# Patient Record
Sex: FEMALE | Race: WHITE | Employment: FULL TIME | URBAN - METROPOLITAN AREA
[De-identification: names, ages, dates, MRNs, and addresses within clinical notes are randomized per-mention and may not be internally consistent; named-entity substitution may affect disease eponyms.]

---

## 2017-05-01 RX ORDER — DESOGESTREL AND ETHINYL ESTRADIOL 21-5 (28)
1 KIT ORAL DAILY
COMMUNITY

## 2017-05-01 NOTE — PERIOP NOTES
[facility]  Ambulatory Surgery Unit  Pre-operative Instructions    Surgery/Procedure Date  5/3/17            Tentative Arrival Time 0645      1. On the day of your surgery/procedure, please report to the Ambulatory Surgery Unit Registration Desk and sign in at your designated time. The Ambulatory Surgery Unit is located in [facility] on the [facility] side of the [facility] across from the [address] [place] [address]. Please have all of your health insurance cards and a photo ID. 2. You must have someone with you to drive you home, as you should not drive a car for 24 hours following anesthesia. Please make arrangements for a responsible adult friend or family member to stay with you for at least the first 24 hours after your surgery. 3. Do not have anything to eat or drink (including water, gum, mints, coffee, juice) after midnight   5/2/17. This may not apply to medications prescribed by your physician. (Please note below the special instructions with medications to take the morning of surgery, if applicable.)    4. We recommend you do not drink any alcoholic beverages for 24 hours before and after your surgery. 5. Stop all Aspirin, non-steroidal anti-inflammatory drugs (i.e. Advil, Aleve), vitamins, and supplements as directed by your surgeon's office. **If you are currently taking Plavix, Coumadin, or other blood-thinning agents, contact your surgeon for instructions. **    6. In an effort to help prevent surgical site infection, we ask that you shower with an anti-bacterial soap (i.e. Dial or Safeguard) for 3 days prior to and on the morning of surgery, using a fresh towel after each shower. (Please begin this process with fresh bed linens.) Do not apply any lotions, powders, or deodorants after the shower on the day of your procedure. If applicable, please do not shave the operative site for 48 hours prior to surgery. 7. Wear comfortable clothes. Wear glasses instead of contacts. Do not bring any jewelry or money (other than copays or fees as instructed). Do not wear make-up, particularly mascara, the morning of your surgery. Do not wear nail polish, particularly if you are having foot /hand surgery. Wear your hair loose or down, no ponytails, buns, suellen pins or clips. All body piercings must be removed. 8. You should understand that if you do not follow these instructions your surgery may be cancelled. If your physical condition changes (i.e. fever, cold or flu) please contact your surgeon as soon as possible. 9. It is important that you be on time. If a situation occurs where you may be late, or if you have any questions or problems, please call [phone].    10. Your surgery time may be subject to change. You will receive a phone call the day prior to surgery to confirm your arrival time. 11. Pediatric patients: please bring a change of clothes, diapers, bottle/sippy cup, pacifier, etc.      Special Instructions: Take all medications and inhalers, as prescribed, on the morning of surgery with a sip of water EXCEPT: none      I understand a pre-operative phone call will be made to verify my surgery time. In the event that I am not available, I give permission for a message to be left on my answering service and/or with another person?       Yes     (instructions given verbally during phone assessment- pt voiced understanding)     ___________________      ___________________      ________________  (Signature of Patient)          (Witness)                   (Date and Time)

## 2017-05-02 ENCOUNTER — ANESTHESIA EVENT (OUTPATIENT)
Dept: SURGERY | Age: 22
End: 2017-05-02
Payer: COMMERCIAL

## 2017-05-03 ENCOUNTER — HOSPITAL ENCOUNTER (OUTPATIENT)
Age: 22
Setting detail: OUTPATIENT SURGERY
Discharge: HOME | End: 2017-05-03
Attending: OBSTETRICS & GYNECOLOGY | Admitting: OBSTETRICS & GYNECOLOGY
Payer: COMMERCIAL

## 2017-05-03 ENCOUNTER — ANESTHESIA (OUTPATIENT)
Dept: SURGERY | Age: 22
End: 2017-05-03
Payer: COMMERCIAL

## 2017-05-03 VITALS
OXYGEN SATURATION: 97 % | RESPIRATION RATE: 20 BRPM | HEART RATE: 91 BPM | WEIGHT: 139 LBS | HEIGHT: 62 IN | TEMPERATURE: 98.4 F | SYSTOLIC BLOOD PRESSURE: 126 MMHG | DIASTOLIC BLOOD PRESSURE: 82 MMHG | BODY MASS INDEX: 25.58 KG/M2

## 2017-05-03 LAB — HCG UR QL: NEGATIVE

## 2017-05-03 PROCEDURE — 74011000250 HC RX REV CODE- 250

## 2017-05-03 PROCEDURE — 88305 TISSUE EXAM BY PATHOLOGIST: CPT | Performed by: OBSTETRICS & GYNECOLOGY

## 2017-05-03 PROCEDURE — 74011250636 HC RX REV CODE- 250/636: Performed by: ANESTHESIOLOGY

## 2017-05-03 PROCEDURE — 76210000040 HC AMBSU PH I REC FIRST 0.5 HR: Performed by: OBSTETRICS & GYNECOLOGY

## 2017-05-03 PROCEDURE — 76210000046 HC AMBSU PH II REC FIRST 0.5 HR: Performed by: OBSTETRICS & GYNECOLOGY

## 2017-05-03 PROCEDURE — 77030003666 HC NDL SPINAL BD -A: Performed by: OBSTETRICS & GYNECOLOGY

## 2017-05-03 PROCEDURE — 74011250636 HC RX REV CODE- 250/636: Performed by: OBSTETRICS & GYNECOLOGY

## 2017-05-03 PROCEDURE — 77030020255 HC SOL INJ LR 1000ML BG: Performed by: OBSTETRICS & GYNECOLOGY

## 2017-05-03 PROCEDURE — 77030018836 HC SOL IRR NACL ICUM -A: Performed by: OBSTETRICS & GYNECOLOGY

## 2017-05-03 PROCEDURE — 77030019927 HC TBNG IRR CYSTO BAXT -A: Performed by: OBSTETRICS & GYNECOLOGY

## 2017-05-03 PROCEDURE — 74011250636 HC RX REV CODE- 250/636

## 2017-05-03 PROCEDURE — 77030021352 HC CBL LD SYS DISP COVD -B: Performed by: OBSTETRICS & GYNECOLOGY

## 2017-05-03 PROCEDURE — 74011000250 HC RX REV CODE- 250: Performed by: OBSTETRICS & GYNECOLOGY

## 2017-05-03 PROCEDURE — 76030000000 HC AMB SURG OR TIME 0.5 TO 1: Performed by: OBSTETRICS & GYNECOLOGY

## 2017-05-03 PROCEDURE — 76060000061 HC AMB SURG ANES 0.5 TO 1 HR: Performed by: OBSTETRICS & GYNECOLOGY

## 2017-05-03 PROCEDURE — 77030031139 HC SUT VCRL2 J&J -A: Performed by: OBSTETRICS & GYNECOLOGY

## 2017-05-03 PROCEDURE — 81025 URINE PREGNANCY TEST: CPT

## 2017-05-03 RX ORDER — SODIUM CHLORIDE 0.9 % (FLUSH) 0.9 %
5-10 SYRINGE (ML) INJECTION AS NEEDED
Status: DISCONTINUED | OUTPATIENT
Start: 2017-05-03 | End: 2017-05-03 | Stop reason: HOSPADM

## 2017-05-03 RX ORDER — SODIUM CHLORIDE 9 MG/ML
INJECTION, SOLUTION INTRAVENOUS
Status: DISCONTINUED | OUTPATIENT
Start: 2017-05-03 | End: 2017-05-03 | Stop reason: HOSPADM

## 2017-05-03 RX ORDER — ONDANSETRON 2 MG/ML
4 INJECTION INTRAMUSCULAR; INTRAVENOUS AS NEEDED
Status: DISCONTINUED | OUTPATIENT
Start: 2017-05-03 | End: 2017-05-03 | Stop reason: HOSPADM

## 2017-05-03 RX ORDER — PROPOFOL 10 MG/ML
INJECTION, EMULSION INTRAVENOUS
Status: DISCONTINUED | OUTPATIENT
Start: 2017-05-03 | End: 2017-05-03 | Stop reason: HOSPADM

## 2017-05-03 RX ORDER — FENTANYL CITRATE 50 UG/ML
25 INJECTION, SOLUTION INTRAMUSCULAR; INTRAVENOUS
Status: DISCONTINUED | OUTPATIENT
Start: 2017-05-03 | End: 2017-05-03 | Stop reason: HOSPADM

## 2017-05-03 RX ORDER — DIPHENHYDRAMINE HYDROCHLORIDE 50 MG/ML
12.5 INJECTION, SOLUTION INTRAMUSCULAR; INTRAVENOUS AS NEEDED
Status: DISCONTINUED | OUTPATIENT
Start: 2017-05-03 | End: 2017-05-03 | Stop reason: HOSPADM

## 2017-05-03 RX ORDER — SODIUM CHLORIDE 0.9 % (FLUSH) 0.9 %
5-10 SYRINGE (ML) INJECTION EVERY 8 HOURS
Status: DISCONTINUED | OUTPATIENT
Start: 2017-05-03 | End: 2017-05-03 | Stop reason: HOSPADM

## 2017-05-03 RX ORDER — MORPHINE SULFATE 10 MG/ML
2 INJECTION, SOLUTION INTRAMUSCULAR; INTRAVENOUS
Status: DISCONTINUED | OUTPATIENT
Start: 2017-05-03 | End: 2017-05-03 | Stop reason: HOSPADM

## 2017-05-03 RX ORDER — HYDROMORPHONE HYDROCHLORIDE 1 MG/ML
.2-.5 INJECTION, SOLUTION INTRAMUSCULAR; INTRAVENOUS; SUBCUTANEOUS
Status: DISCONTINUED | OUTPATIENT
Start: 2017-05-03 | End: 2017-05-03 | Stop reason: HOSPADM

## 2017-05-03 RX ORDER — SODIUM CHLORIDE, SODIUM LACTATE, POTASSIUM CHLORIDE, CALCIUM CHLORIDE 600; 310; 30; 20 MG/100ML; MG/100ML; MG/100ML; MG/100ML
25 INJECTION, SOLUTION INTRAVENOUS CONTINUOUS
Status: DISCONTINUED | OUTPATIENT
Start: 2017-05-03 | End: 2017-05-03 | Stop reason: HOSPADM

## 2017-05-03 RX ORDER — KETOROLAC TROMETHAMINE 30 MG/ML
INJECTION, SOLUTION INTRAMUSCULAR; INTRAVENOUS AS NEEDED
Status: DISCONTINUED | OUTPATIENT
Start: 2017-05-03 | End: 2017-05-03 | Stop reason: HOSPADM

## 2017-05-03 RX ORDER — LIDOCAINE HYDROCHLORIDE 20 MG/ML
INJECTION, SOLUTION EPIDURAL; INFILTRATION; INTRACAUDAL; PERINEURAL AS NEEDED
Status: DISCONTINUED | OUTPATIENT
Start: 2017-05-03 | End: 2017-05-03 | Stop reason: HOSPADM

## 2017-05-03 RX ORDER — MIDAZOLAM HYDROCHLORIDE 1 MG/ML
INJECTION, SOLUTION INTRAMUSCULAR; INTRAVENOUS AS NEEDED
Status: DISCONTINUED | OUTPATIENT
Start: 2017-05-03 | End: 2017-05-03 | Stop reason: HOSPADM

## 2017-05-03 RX ORDER — ONDANSETRON 2 MG/ML
INJECTION INTRAMUSCULAR; INTRAVENOUS AS NEEDED
Status: DISCONTINUED | OUTPATIENT
Start: 2017-05-03 | End: 2017-05-03 | Stop reason: HOSPADM

## 2017-05-03 RX ORDER — FENTANYL CITRATE 50 UG/ML
INJECTION, SOLUTION INTRAMUSCULAR; INTRAVENOUS AS NEEDED
Status: DISCONTINUED | OUTPATIENT
Start: 2017-05-03 | End: 2017-05-03 | Stop reason: HOSPADM

## 2017-05-03 RX ORDER — SILVER NITRATE 38.21; 12.74 MG/1; MG/1
STICK TOPICAL AS NEEDED
Status: DISCONTINUED | OUTPATIENT
Start: 2017-05-03 | End: 2017-05-03 | Stop reason: HOSPADM

## 2017-05-03 RX ADMIN — FENTANYL CITRATE 25 MCG: 50 INJECTION, SOLUTION INTRAMUSCULAR; INTRAVENOUS at 08:04

## 2017-05-03 RX ADMIN — FENTANYL CITRATE 25 MCG: 50 INJECTION, SOLUTION INTRAMUSCULAR; INTRAVENOUS at 08:11

## 2017-05-03 RX ADMIN — FENTANYL CITRATE 25 MCG: 50 INJECTION, SOLUTION INTRAMUSCULAR; INTRAVENOUS at 08:15

## 2017-05-03 RX ADMIN — ONDANSETRON 4 MG: 2 INJECTION INTRAMUSCULAR; INTRAVENOUS at 08:16

## 2017-05-03 RX ADMIN — LIDOCAINE HYDROCHLORIDE 80 MG: 20 INJECTION, SOLUTION EPIDURAL; INFILTRATION; INTRACAUDAL; PERINEURAL at 08:04

## 2017-05-03 RX ADMIN — KETOROLAC TROMETHAMINE 30 MG: 30 INJECTION, SOLUTION INTRAMUSCULAR; INTRAVENOUS at 08:36

## 2017-05-03 RX ADMIN — SODIUM CHLORIDE, SODIUM LACTATE, POTASSIUM CHLORIDE, AND CALCIUM CHLORIDE 25 ML/HR: 600; 310; 30; 20 INJECTION, SOLUTION INTRAVENOUS at 07:28

## 2017-05-03 RX ADMIN — SODIUM CHLORIDE, POTASSIUM CHLORIDE, SODIUM LACTATE AND CALCIUM CHLORIDE: 600; 310; 30; 20 INJECTION, SOLUTION INTRAVENOUS at 07:04

## 2017-05-03 RX ADMIN — PROPOFOL 100 MCG/KG/MIN: 10 INJECTION, EMULSION INTRAVENOUS at 08:04

## 2017-05-03 RX ADMIN — FENTANYL CITRATE 25 MCG: 50 INJECTION, SOLUTION INTRAMUSCULAR; INTRAVENOUS at 08:10

## 2017-05-03 RX ADMIN — MIDAZOLAM HYDROCHLORIDE 4 MG: 1 INJECTION, SOLUTION INTRAMUSCULAR; INTRAVENOUS at 08:02

## 2017-05-03 NOTE — OP NOTES
Gynecologic Hysteroscopy, D&C Procedure Note    Patient ID:     Name: Bessy    Medical Record Number: [id number]    YOB: 1995    May 3, 2017      Preoperative Diagnosis:   DYSFUNCTIONAL UTERINE BLEEDING    Postoperative Diagnosis: DYSFUNCTIONAL UTERINE BLEEDING    Surgeon: Kang Banerjee MD    Assistant: None    Anesthesia: General    Procedure: 1. Hysteroscopy            2. Dilation and curettage    Estimated Blood Loss: Minimal    Pathology /Specimens: Endometrial curettings    Complications: None    Findings: 1. Normal size, shape, and contoured uterus without any adnexal masses on exam under anesthesia. 2. Uterus sounded to 7cm. 3. Normal-appearing endometrial cavity. Thin endometrium without polyps. Ostia seen bilaterally. Signed By: Kang Banerjee MD    Indication: This is a 19year-old  1 para 0 with approximately 6 months of daily vaginal bleeding despite attempts to control it with combined oral contraceptives. The bleeding has responded in the past to the addition of estradiol via a patch but has failed to do so in recent months. Lab evaluation is reassuring. While a pelvic ultrasound reveals a thin endometrium at 1.8mm, an endometrial biopsy was suggestive of endometrial polyps. Given this discrepancy, we discussed further work-up and management via hysteroscopy, D&C, possible endometrial polypectomy to which the patient opted to proceed. Procedure in Detail:  The patient was taken to the operating room where she was correctly identified and placed on the operating room table. Anesthesia was induced and she was placed in the dorsal lithotomy position. The patient was prepped and draped in a sterile fashion. Exam under anesthesia revealed the above noted findings. Retractors were introduced into the vagina and the cervix was visualized and grasped with a single tooth tenaculum.  The uterus was sounded to 7 cm and the cervix was progressively dilated to accommodate the hysteroscope. The hysteroscope was inserted and the uterine cavity visualized including the bilateral ostia. The hysteroscope was removed. The endometrial cavity was curetted until an adequate cry was achieved throughout. A small amount of normal-appearing endometrial tissue was obtained and sent to pathology for analysis. All instruments were removed from the uterus. The tenaculum was removed from the cervix and the sites made hemostatic with a figure-of-eight of 3-0 vicryl and silver nitrate. All instruments were removed from the patient's vagina. This now concluded the procedure. All sponge and instrument counts were correct. The patient was cleansed, awakened and taken to the recovery room in stable condition.

## 2017-05-03 NOTE — ANESTHESIA POSTPROCEDURE EVALUATION
Post-Anesthesia Evaluation and Assessment    Patient: Rickey Antoine MRN: [id number]  SSN: [id number]    YOB: 1995  Age: 19 y.o. Sex: female       Cardiovascular Function/Vital Signs  Visit Vitals    /82    Pulse 91    Temp 36.9 °C (98.4 °F)    Resp 20    Ht 5' 2\" (1.575 m)    Wt 63 kg (139 lb)    SpO2 97%    BMI 25.42 kg/m2       Patient is status post general, total IV anesthesia anesthesia for Procedure(s): HYSTEROSCOPY DILITATION AND CURETTAGE. Nausea/Vomiting: None    Postoperative hydration reviewed and adequate. Pain:  Pain Scale 1: Numeric (0 - 10) (05/03/17 0916)  Pain Intensity 1: 0 (05/03/17 0916)   Managed    Neurological Status:   Neuro (WDL): Within Defined Limits (05/03/17 0916)  Neuro  Neurologic State: Alert (05/03/17 0916)   At baseline    Mental Status and Level of Consciousness: Arousable    Pulmonary Status:   O2 Device: Room air (05/03/17 0916)   Adequate oxygenation and airway patent    Complications related to anesthesia: None    Post-anesthesia assessment completed.  No concerns    Signed By: Hai Dumont MD     May 3, 2017

## 2017-05-03 NOTE — DISCHARGE INSTRUCTIONS
After Care Instructions For Your D&C      1. You may resume your usual diet once the nausea resolves. Initially, try sips of warm fluids and a bland diet. 2. Avoid heavy lifting and straining. Gradually increase your activity. First, try walking and doing light activity around the house. Resume your normal habits if no significant discomfort or bleeding develops. Most women can return to work within one to four days after this procedure. 3. You may take showers. Avoid using a tub bath, swimming pool or hot tub until after your check-up. 4. Do not place anything in your vagina until after your postoperative visit. Do not   douche, use tampons, or have intercourse because this may cause bleeding and   infection. 5. You may initially experience a heavy bloody discharge. This should not be more than your menstrual flow. Over the next several days, the flow should steadily decrease. 6. Typically following the procedure, there is little or no pain. You may feel cramps in your lower abdomen. Tylenol may relieve mild cramping. If pain medication does not improve your symptoms, you should contact your physician. 7. Contact the office if you have excessive bleeding (saturating a pad an hour for two hours or passing large clots). It is also necessary to speak with your physician if you develop chills, a temperature greater than 100.4, difficulty voiding or burning on urination. 8. Your physician may want to see you in the office after your D&C. Please call for an appointment if this has not already been arranged. Our office phone number is [phone]. If appropriate, the microscopic results from your procedure will be discussed at this follow-up visit. You received Toradol during your surgery.  You may not take any form of NSAIDS (non steroidal anti inflammatory drugs) such as Advil, Ibuprofen, Aleve, Motrin until 2:00pm.        DISCHARGE SUMMARY from Nurse    The following personal items collected during your admission are returned to you:   Dental Appliance: Dental Appliances: None  Vision: Visual Aid: Glasses (given to boyfriend)  Hearing Aid:    Jewelry: Jewelry: None  Clothing: Clothing: With patient  Other Valuables: Other Valuables: Priscilla Nathanael, Cell Phone (given to boyfriend)  Valuables sent to safe:        PATIENT INSTRUCTIONS:    After General Anesthesia or Intravenous Sedation, for 24 hours or while taking prescription Narcotics:        Someone should be with you for the next 24 hours. For your own safety, a responsible adult must drive you home. · Limit your activities  · Recommended activity: Rest today, up with assistance today. Do not climb stairs or shower unattended for the next 24 hours. · Please start with a soft bland diet and advance as tolerated (no nausea) to regular diet. · If you have a sore throat you should try the following: fluids, warm salt water gargles, or throat lozenges. If it does not improve after several days please follow up with your primary physician. · Do not drive and operate hazardous machinery  · Do not make important personal or business decisions  · Do  not drink alcoholic beverages  · If you have not urinated within 8 hours after discharge, please contact your surgeon on call. Report the following to your surgeon:  · Excessive pain, swelling, redness or odor of or around the surgical area  · Temperature over 100.5  · Nausea and vomiting lasting longer than 4 hours or if unable to take medications  · Any signs of decreased circulation or nerve impairment to extremity: change in color, persistent  numbness, tingling, coldness or increase pain      · You will receive a Post Operative Call from one of the Recovery Room Nurses on the day after your surgery to check on you. It is very important for us to know how you are recovering after your surgery.  If you have an issue or need to speak with someone, please call your surgeon, do not wait for the post operative call. · You may receive an e-mail or letter in the mail from [employer] regarding your experience with us in the Ambulatory Surgery Unit. Your feedback is valuable to us and we appreciate your participation in the survey. · If the above instructions are not adequate, please contact Ishmael Luis RN, Chandrika anesthesia Nurse Manager or our Anesthesiologist, at [phone]. If you are having problems after your surgery, call the physician at his office number. · We wish you a speedy recovery ? What to do at Home:      *  Please give a list of your current medications to your Primary Care Provider. *  Please update this list whenever your medications are discontinued, doses are      changed, or new medications (including over-the-counter products) are added. *  Please carry medication information at all times in case of emergency situations. These are general instructions for a healthy lifestyle:    No smoking/ No tobacco products/ Avoid exposure to second hand smoke    Surgeon General's Warning:  Quitting smoking now greatly reduces serious risk to your health. Obesity, smoking, and sedentary lifestyle greatly increases your risk for illness    A healthy diet, regular physical exercise & weight monitoring are important for maintaining a healthy lifestyle    You may be retaining fluid if you have a history of heart failure or if you experience any of the following symptoms:  Weight gain of 3 pounds or more overnight or 5 pounds in a week, increased swelling in our hands or feet or shortness of breath while lying flat in bed. Please call your doctor as soon as you notice any of these symptoms; do not wait until your next office visit.     Recognize signs and symptoms of STROKE:    F-face looks uneven    A-arms unable to move or move even    S-speech slurred or non-existent    T-time-call 911 as soon as signs and symptoms begin-DO NOT go       Back to bed or wait to see if you get better-TIME IS BRAIN. If you have not received your influenza and/or pneumococcal vaccine, please follow up with your primary care physician. The discharge information has been reviewed with the patient and caregiver. The patient and caregiver verbalized understanding.

## 2017-05-03 NOTE — ANESTHESIA POSTPROCEDURE EVALUATION
Post-Anesthesia Evaluation and Assessment    Patient: Cherelle Ramsey MRN: [id number]  SSN: [id number]    YOB: 1995  Age: 19 y.o. Sex: female       Cardiovascular Function/Vital Signs  Visit Vitals    /82    Pulse 91    Temp 36.9 °C (98.4 °F)    Resp 20    Ht 5' 2\" (1.575 m)    Wt 63 kg (139 lb)    SpO2 97%    BMI 25.42 kg/m2       Patient is status post general, total IV anesthesia anesthesia for Procedure(s): HYSTEROSCOPY DILITATION AND CURETTAGE. Nausea/Vomiting: None    Postoperative hydration reviewed and adequate. Pain:  Pain Scale 1: Numeric (0 - 10) (05/03/17 0916)  Pain Intensity 1: 0 (05/03/17 0916)   Managed    Neurological Status:   Neuro (WDL): Within Defined Limits (05/03/17 0916)  Neuro  Neurologic State: Alert (05/03/17 0916)   At baseline    Mental Status and Level of Consciousness: Arousable    Pulmonary Status:   O2 Device: Room air (05/03/17 0916)   Adequate oxygenation and airway patent    Complications related to anesthesia: None    Post-anesthesia assessment completed.  No concerns    Signed By: Rios Howard MD     May 3, 2017

## 2017-05-03 NOTE — ANESTHESIA PREPROCEDURE EVALUATION
Anesthetic History   No history of anesthetic complications            Review of Systems / Medical History  Patient summary reviewed, nursing notes reviewed and pertinent labs reviewed    Pulmonary  Within defined limits                 Neuro/Psych   Within defined limits           Cardiovascular  Within defined limits                Exercise tolerance: >4 METS     GI/Hepatic/Renal  Within defined limits              Endo/Other  Within defined limits           Other Findings              Physical Exam    Airway  Mallampati: II  TM Distance: 4 - 6 cm  Neck ROM: normal range of motion   Mouth opening: Normal     Cardiovascular  Regular rate and rhythm,  S1 and S2 normal,  no murmur, click, rub, or gallop             Dental  No notable dental hx       Pulmonary  Breath sounds clear to auscultation               Abdominal  GI exam deferred       Other Findings            Anesthetic Plan    ASA: 1  Anesthesia type: general and total IV anesthesia          Induction: Intravenous  Anesthetic plan and risks discussed with: Patient

## 2017-05-03 NOTE — IP AVS SNAPSHOT
[address] [facility] 
[phone] Patient: Bessy MRN: [id number] :1995 You are allergic to the following Allergen Reactions Keflex (Cephalexin) Other (comments) \"doesn't work\" Recent Documentation Height Weight BMI OB Status Smoking Status 1.575 m 63 kg 25.42 kg/m2 Unknown Never Smoker Emergency Contacts Name Discharge Info Relation Home Work Mobile Felecia Carolin  Mother [14] [phone] [address] CAREGIVER [3] Boyfriend [[phone]] [phone] Michael Lima CAREGIVER [3] Friend [5] [phone] About your hospitalization You were admitted on:  May 3, 2017 You last received care in the:  [facility] PACU You were discharged on:  May 3, 2017 Unit phone number:  [phone] Why you were hospitalized Your primary diagnosis was:  Not on File Providers Seen During Your Hospitalizations Provider Role Specialty Primary office phone Kang Banerjee MD Attending Provider Obstetrics & Gynecology [phone] Your Primary Care Physician (PCP) Primary Care Physician Office Phone Office Fax NOT ON FILE ** None ** ** None ** Follow-up Information Follow up With Details Comments Contact Info Kang Banerjee MD Schedule an appointment as soon as possible for a visit in 2 week(s)  [address] [facility] 
[phone] Current Discharge Medication List  
  
CONTINUE these medications which have NOT CHANGED Dose & Instructions Dispensing Information Comments Morning Noon Evening Bedtime KARIVA (28) 0.15-0.02 mgx21 /0.01 mg x 5 Tab Generic drug:  desog-e.estradiol/e.estradiol Your last dose was: Your next dose is:    
   
   
 Dose:  1 Tab Take 1 Tab by mouth daily. Refills:  0 Discharge Instructions After Care Instructions For Your D&C 
 
 
1. You may resume your usual diet once the nausea resolves. Initially, try sips of warm fluids and a bland diet. 2. Avoid heavy lifting and straining. Gradually increase your activity. First, try walking and doing light activity around the house. Resume your normal habits if no significant discomfort or bleeding develops. Most women can return to work within one to four days after this procedure. 3. You may take showers. Avoid using a tub bath, swimming pool or hot tub until after your check-up. 4. Do not place anything in your vagina until after your postoperative visit. Do not  
douche, use tampons, or have intercourse because this may cause bleeding and  
infection. 5. You may initially experience a heavy bloody discharge. This should not be more than your menstrual flow. Over the next several days, the flow should steadily decrease. 6. Typically following the procedure, there is little or no pain. You may feel cramps in your lower abdomen. Tylenol may relieve mild cramping. If pain medication does not improve your symptoms, you should contact your physician. 7. Contact the office if you have excessive bleeding (saturating a pad an hour for two hours or passing large clots). It is also necessary to speak with your physician if you develop chills, a temperature greater than 100.4, difficulty voiding or burning on urination. 8. Your physician may want to see you in the office after your D&C. Please call for an appointment if this has not already been arranged. Our office phone number is [phone]. If appropriate, the microscopic results from your procedure will be discussed at this follow-up visit. You received Toradol during your surgery.  You may not take any form of NSAIDS (non steroidal anti inflammatory drugs) such as Advil, Ibuprofen, Aleve, Motrin until 2:00pm. 
 
 
 
 DISCHARGE SUMMARY from Nurse The following personal items collected during your admission are returned to you:  
Dental Appliance: Dental Appliances: None Vision: Visual Aid: Glasses (given to boyfriend) Hearing Aid:   
Jewelry: Jewelry: None Clothing: Clothing: With patient Other Valuables: Other Valuables: Gallo Calhoun, Cell Phone (given to boyfriend) Valuables sent to safe:   
 
 
PATIENT INSTRUCTIONS: 
 
 
F-face looks uneven A-arms unable to move or move even S-speech slurred or non-existent T-time-call 911 as soon as signs and symptoms begin-DO NOT go Back to bed or wait to see if you get better-TIME IS BRAIN. If you have not received your influenza and/or pneumococcal vaccine, please follow up with your primary care physician. The discharge information has been reviewed with the patient and caregiver. The patient and caregiver verbalized understanding. Discharge Orders None Introducing [facility] SERVICES! [employer] introduces [employer] patient portal. Now you can access parts of your medical record, email your doctor's office, and request medication refills online. 1. In your internet browser, go to [email or web address] 2. Click on the First Time User? Click Here link in the Sign In box. You will see the New Member Sign Up page. 3. Enter your [employer] Access Code exactly as it appears below. You will not need to use this code after youve completed the sign-up process. If you do not sign up before the expiration date, you must request a new code. · [employer] Access Code: [email or web address] Expires: 7/17/2017 10:42 AM 
 
4. Enter the last four digits of your Social Security Number (xxxx) and Date of Birth (mm/dd/yyyy) as indicated and click Submit. You will be taken to the next sign-up page. 5. Create a [employer] ID. This will be your [employer] login ID and cannot be changed, so think of one that is secure and easy to remember. 6. Create a [employer] password. You can change your password at any time. 7. Enter your Password Reset Question and Answer. This can be used at a later time if you forget your password. 8. Enter your e-mail address. You will receive e-mail notification when new information is available in [address] 9. Click Sign Up. You can now view and download portions of your medical record. 10. Click the Download Summary menu link to download a portable copy of your medical information. If you have questions, please visit the Frequently Asked Questions section of the MyChart website. Remember, MyChart is NOT to be used for urgent needs. For medical emergencies, dial 911. Now available from your iPhone and Android! General Information Please provide this summary of care documentation to your next provider. Patient Signature:  ____________________________________________________________ Date:  ____________________________________________________________  
  
Isiah Agustina Provider Signature:  ____________________________________________________________ Date:  ____________________________________________________________

## 2017-05-03 NOTE — PERIOP NOTES
Rodo Turk  1995  [id number]    Situation:  Verbal report given from: ARTI Smith CRNA  Procedure: Procedure(s):   HYSTEROSCOPY DILITATION AND CURETTAGE    Background:    Preoperative diagnosis: DYSFUNCTIONAL UTERINE BLEEDING    Postoperative diagnosis: DYSFUNCTIONAL UTERINE BLEEDING    :  Dr. Elbridge Ormond    Assistant(s): Circ-1: Ramiro Hickey  Scrub Tech-1: Nicole Chase    Specimens:   ID Type Source Tests Collected by Time Destination   1 : Endometrial curettings Preservative Endometrial Curetting  Sarah Mas MD 5/3/2017 0910 Pathology       Assessment:  Intra-procedure medications         Anesthesia gave intra-procedure sedation and medications, see anesthesia flow sheet     Intravenous fluids: LR@ KVO     Vital signs stable       Recommendation:    Permission to share finding with family or friend yes

## 2018-12-10 ENCOUNTER — APPOINTMENT (OUTPATIENT)
Dept: GENERAL RADIOLOGY | Age: 23
End: 2018-12-10
Payer: COMMERCIAL

## 2018-12-10 ENCOUNTER — HOSPITAL ENCOUNTER (EMERGENCY)
Age: 23
Discharge: HOME | End: 2018-12-10
Attending: EMERGENCY MEDICINE
Payer: COMMERCIAL

## 2018-12-10 ENCOUNTER — APPOINTMENT (OUTPATIENT)
Dept: CT IMAGING | Age: 23
End: 2018-12-10
Payer: COMMERCIAL

## 2018-12-10 VITALS
HEART RATE: 95 BPM | RESPIRATION RATE: 18 BRPM | TEMPERATURE: 98.7 F | HEIGHT: 62 IN | OXYGEN SATURATION: 98 % | SYSTOLIC BLOOD PRESSURE: 135 MMHG | BODY MASS INDEX: 25.76 KG/M2 | WEIGHT: 140 LBS | DIASTOLIC BLOOD PRESSURE: 81 MMHG

## 2018-12-10 DIAGNOSIS — S93.402A SPRAIN OF LEFT ANKLE, UNSPECIFIED LIGAMENT, INITIAL ENCOUNTER: ICD-10-CM

## 2018-12-10 DIAGNOSIS — M54.50 ACUTE BILATERAL LOW BACK PAIN WITHOUT SCIATICA: ICD-10-CM

## 2018-12-10 DIAGNOSIS — R10.30 LOWER ABDOMINAL PAIN: ICD-10-CM
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
## 2018-12-10 DIAGNOSIS — R51.9 ACUTE INTRACTABLE HEADACHE, UNSPECIFIED HEADACHE TYPE: Primary | ICD-10-CM

## 2018-12-10 DIAGNOSIS — M54.2 NECK PAIN: ICD-10-CM

## 2018-12-10 LAB — HCG UR QL: NEGATIVE

## 2018-12-10 PROCEDURE — 74011636320 HC RX REV CODE- 636/320: Performed by: EMERGENCY MEDICINE

## 2018-12-10 PROCEDURE — 74011250637 HC RX REV CODE- 250/637: Performed by: PHYSICIAN ASSISTANT

## 2018-12-10 PROCEDURE — 99284 EMERGENCY DEPT VISIT MOD MDM: CPT

## 2018-12-10 PROCEDURE — 74011250636 HC RX REV CODE- 250/636: Performed by: PHYSICIAN ASSISTANT

## 2018-12-10 PROCEDURE — 74177 CT ABD & PELVIS W/CONTRAST: CPT

## 2018-12-10 PROCEDURE — 73610 X-RAY EXAM OF ANKLE: CPT

## 2018-12-10 PROCEDURE — 72125 CT NECK SPINE W/O DYE: CPT

## 2018-12-10 PROCEDURE — 70450 CT HEAD/BRAIN W/O DYE: CPT

## 2018-12-10 PROCEDURE — 71101 X-RAY EXAM UNILAT RIBS/CHEST: CPT

## 2018-12-10 PROCEDURE — 74011250636 HC RX REV CODE- 250/636: Performed by: EMERGENCY MEDICINE

## 2018-12-10 PROCEDURE — 81025 URINE PREGNANCY TEST: CPT

## 2018-12-10 PROCEDURE — 96374 THER/PROPH/DIAG INJ IV PUSH: CPT

## 2018-12-10 RX ORDER — KETOROLAC TROMETHAMINE 30 MG/ML
30 INJECTION, SOLUTION INTRAMUSCULAR; INTRAVENOUS
Status: COMPLETED | OUTPATIENT
Start: 2018-12-10 | End: 2018-12-10

## 2018-12-10 RX ORDER — HYDROCODONE BITARTRATE AND ACETAMINOPHEN 5; 325 MG/1; MG/1
1 TABLET ORAL
Qty: 12 TAB | Refills: 0 | Status: SHIPPED | OUTPATIENT
Start: 2018-12-10

## 2018-12-10 RX ORDER — SODIUM CHLORIDE 9 MG/ML
50 INJECTION, SOLUTION INTRAVENOUS
Status: COMPLETED | OUTPATIENT
Start: 2018-12-10 | End: 2018-12-10

## 2018-12-10 RX ORDER — METHOCARBAMOL 750 MG/1
750 TABLET, FILM COATED ORAL 3 TIMES DAILY
Qty: 15 TAB | Refills: 0 | Status: SHIPPED | OUTPATIENT
Start: 2018-12-10 | End: 2018-12-15

## 2018-12-10 RX ORDER — SODIUM CHLORIDE 0.9 % (FLUSH) 0.9 %
10 SYRINGE (ML) INJECTION
Status: COMPLETED | OUTPATIENT
Start: 2018-12-10 | End: 2018-12-10

## 2018-12-10 RX ORDER — ONDANSETRON 4 MG/1
4 TABLET, ORALLY DISINTEGRATING ORAL
Status: COMPLETED | OUTPATIENT
Start: 2018-12-10 | End: 2018-12-10

## 2018-12-10 RX ORDER — IBUPROFEN 600 MG/1
600 TABLET ORAL
Qty: 20 TAB | Refills: 0 | Status: SHIPPED | OUTPATIENT
Start: 2018-12-10

## 2018-12-10 RX ORDER — BUTALBITAL, ACETAMINOPHEN AND CAFFEINE 50; 325; 40 MG/1; MG/1; MG/1
1 TABLET ORAL
Status: COMPLETED | OUTPATIENT
Start: 2018-12-10 | End: 2018-12-10

## 2018-12-10 RX ADMIN — ONDANSETRON 4 MG: 4 TABLET, ORALLY DISINTEGRATING ORAL at 17:02

## 2018-12-10 RX ADMIN — BUTALBITAL, ACETAMINOPHEN AND CAFFEINE 1 TABLET: 50; 325; 40 TABLET ORAL at 16:50

## 2018-12-10 RX ADMIN — KETOROLAC TROMETHAMINE 30 MG: 30 INJECTION, SOLUTION INTRAMUSCULAR at 19:23

## 2018-12-10 RX ADMIN — SODIUM CHLORIDE 50 ML/HR: 900 INJECTION, SOLUTION INTRAVENOUS at 18:45

## 2018-12-10 RX ADMIN — Medication 10 ML: at 18:45

## 2018-12-10 RX ADMIN — IOPAMIDOL 100 ML: 755 INJECTION, SOLUTION INTRAVENOUS at 18:45

## 2018-12-10 NOTE — LETTER
[facility] EMERGENCY DEPT 
[address] [place] [address] [address] [id number] [phone] Work/School Note Date: 12/10/2018 To Whom It May concern: 
 
Sandeep Holden was seen and treated today in the emergency room. She may return to work in 1 to 2 days, as symptoms improve. Sincerely, Katie Valentin
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
## 2019-02-26 ENCOUNTER — OFFICE VISIT (OUTPATIENT)
Dept: NEUROLOGY | Age: 24
End: 2019-02-26

## 2019-02-26 VITALS
SYSTOLIC BLOOD PRESSURE: 110 MMHG | OXYGEN SATURATION: 98 % | HEART RATE: 93 BPM | HEIGHT: 64 IN | DIASTOLIC BLOOD PRESSURE: 68 MMHG | BODY MASS INDEX: 26.46 KG/M2 | WEIGHT: 155 LBS

## 2019-02-26 DIAGNOSIS — M25.50 HYPERMOBILITY ARTHRALGIA: Primary | ICD-10-CM

## 2019-02-26 DIAGNOSIS — F51.03 PARADOXICAL INSOMNIA: ICD-10-CM

## 2019-02-26 DIAGNOSIS — M54.41 ACUTE BILATERAL LOW BACK PAIN WITH RIGHT-SIDED SCIATICA: ICD-10-CM

## 2019-02-26 DIAGNOSIS — M53.3 SI (SACROILIAC) PAIN: ICD-10-CM

## 2019-02-26 RX ORDER — CYCLOBENZAPRINE HCL 5 MG
5 TABLET ORAL
COMMUNITY

## 2019-02-26 RX ORDER — ZOLPIDEM TARTRATE 10 MG/1
10 TABLET ORAL
Qty: 20 TAB | Refills: 3 | Status: SHIPPED | OUTPATIENT
Start: 2019-02-26

## 2019-02-26 NOTE — PATIENT INSTRUCTIONS
Office Policieso Phone calls/patient messages: Please allow up to 24 hours for someone in the office to contact you about your call or message. Be mindful your provider may be out of the office or your message may require further review. We encourage you to use Aud[employer]cribe for your messages as this is a faster, more efficient way to communicate with our office 
o Medication Refills: 
Prescription medications require up to 48 business hours to process. We encourage you to use Audioscribe for your refills. For controlled medications: Please allow up to 72 business hours to process. Certain medications may require you to  a written prescription at our office. NO narcotic/controlled medications will be prescribed after 4pm Monday through Friday or on weekends 
o Form/Paperwork Completion: We ask that you allow 7-14 business days. You may also download your forms to Audioscribe to have your doctor print off. A Healthy Lifestyle: Care Instructions Your Care Instructions A healthy lifestyle can help you feel good, stay at a healthy weight, and have plenty of energy for both work and play. A healthy lifestyle is something you can share with your whole family. A healthy lifestyle also can lower your risk for serious health problems, such as high blood pressure, heart disease, and diabetes. You can follow a few steps listed below to improve your health and the health of your family. Follow-up care is a key part of your treatment and safety. Be sure to make and go to all appointments, and call your doctor if you are having problems. It's also a good idea to know your test results and keep a list of the medicines you take. How can you care for yourself at home? · Do not eat too much sugar, fat, or fast foods. You can still have dessert and treats now and then. The goal is moderation. · Start small to improve your eating habits.  Pay attention to portion sizes, drink less juice and soda pop, and eat more fruits and vegetables. ? Eat a healthy amount of food. A 3-ounce serving of meat, for example, is about the size of a deck of cards. Fill the rest of your plate with vegetables and whole grains. ? Limit the amount of soda and sports drinks you have every day. Drink more water when you are thirsty. ? Eat at least 5 servings of fruits and vegetables every day. It may seem like a lot, but it is not hard to reach this goal. A serving or helping is 1 piece of fruit, 1 cup of vegetables, or 2 cups of leafy, raw vegetables. Have an apple or some carrot sticks as an afternoon snack instead of a candy bar. Try to have fruits and/or vegetables at every meal. 
· Make exercise part of your daily routine. You may want to start with simple activities, such as walking, bicycling, or slow swimming. Try to be active 30 to 60 minutes every day. You do not need to do all 30 to 60 minutes all at once. For example, you can exercise 3 times a day for 10 or 20 minutes. Moderate exercise is safe for most people, but it is always a good idea to talk to your doctor before starting an exercise program. 
· Keep moving. [employer] the lawn, work in the garden, or "[employer]. Take the stairs instead of the elevator at work. · If you smoke, quit. People who smoke have an increased risk for heart attack, stroke, cancer, and other lung illnesses. Quitting is hard, but there are ways to boost your chance of quitting tobacco for good. ? Use nicotine gum, patches, or lozenges. ? Ask your doctor about stop-smoking programs and medicines. ? Keep trying. In addition to reducing your risk of diseases in the future, you will notice some benefits soon after you stop using tobacco. If you have shortness of breath or asthma symptoms, they will likely get better within a few weeks after you quit. · Limit how much alcohol you drink.  Moderate amounts of alcohol (up to 2 drinks a day for men, 1 drink a day for women) are okay. But drinking too much can lead to liver problems, high blood pressure, and other health problems. Family health If you have a family, there are many things you can do together to improve your health. · Eat meals together as a family as often as possible. · Eat healthy foods. This includes fruits, vegetables, lean meats and dairy, and whole grains. · Include your family in your fitness plan. Most people think of activities such as jogging or tennis as the way to fitness, but there are many ways you and your family can be more active. Anything that makes you breathe hard and gets your heart pumping is exercise. Here are some tips: 
? Walk to do errands or to take your child to school or the bus. 
? Go for a family bike ride after dinner instead of watching TV. Where can you learn more? Go to http://heber-rosalia.info/. Enter U101 in the search box to learn more about \"A Healthy Lifestyle: Care Instructions. \" Current as of: September 11, 2018 Content Version: 11.9 © 7239-2382 [employer]. Care instructions adapted under license by [employer] (which disclaims liability or warranty for this information). If you have questions about a medical condition or this instruction, always ask your healthcare professional. [address] any warranty or liability for your use of this information.
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
## 2022-07-11 ENCOUNTER — TELEPHONE (OUTPATIENT)
Dept: DIABETES SERVICES | Age: 27
End: 2022-07-11

## 2022-07-13 ENCOUNTER — TELEPHONE (OUTPATIENT)
Dept: DIABETES SERVICES | Age: 27
End: 2022-07-13

## 2022-07-25 ENCOUNTER — TELEPHONE (OUTPATIENT)
Dept: DIABETES SERVICES | Age: 27
End: 2022-07-25

## 2022-07-25 NOTE — TELEPHONE ENCOUNTER
Patient contacted to follow up with previous attempts to schedule DSMES. Patient stated she is has scheduled appointments with Dr. Arlene Ferguson, her BG's are in target, and she is expected to deliver in about 2 weeks. Encouraged patient to call office to schedule appointment as needed. Contact information provided to participant.

## 2023-04-19 ENCOUNTER — OFFICE VISIT (OUTPATIENT)
Dept: PRIMARY CARE CLINIC | Age: 28
End: 2023-04-19

## 2023-04-19 VITALS
WEIGHT: 153.8 LBS | SYSTOLIC BLOOD PRESSURE: 141 MMHG | RESPIRATION RATE: 18 BRPM | DIASTOLIC BLOOD PRESSURE: 85 MMHG | OXYGEN SATURATION: 95 % | HEIGHT: 64 IN | BODY MASS INDEX: 26.26 KG/M2 | TEMPERATURE: 101.8 F | HEART RATE: 132 BPM

## 2023-04-19 DIAGNOSIS — B34.9 VIRAL ILLNESS: ICD-10-CM

## 2023-04-19 DIAGNOSIS — J03.90 EXUDATIVE TONSILLITIS: Primary | ICD-10-CM

## 2023-04-19 LAB
LOT NUMBER POC: NORMAL
MONONUCLEOSIS SCREEN POC: NEGATIVE
S PYO AG THROAT QL: NEGATIVE
SARS-COV-2 PCR, POC: NEGATIVE
VALID INTERNAL CONTROL?: YES

## 2023-04-19 RX ORDER — DEXAMETHASONE SODIUM PHOSPHATE 100 MG/10ML
10 INJECTION INTRAMUSCULAR; INTRAVENOUS ONCE
Status: COMPLETED | OUTPATIENT
Start: 2023-04-19 | End: 2023-04-19

## 2023-04-19 RX ORDER — CEFDINIR 300 MG/1
300 CAPSULE ORAL 2 TIMES DAILY
Qty: 20 CAPSULE | Refills: 0 | Status: SHIPPED | OUTPATIENT
Start: 2023-04-19 | End: 2023-04-29

## 2023-04-19 RX ADMIN — DEXAMETHASONE SODIUM PHOSPHATE 10 MG: 100 INJECTION INTRAMUSCULAR; INTRAVENOUS at 14:00

## 2023-04-19 NOTE — PROGRESS NOTES
Identified pt with two pt identifiers(name and ). Reviewed record in preparation for visit and have obtained necessary documentation. Chief Complaint   Patient presents with    Fever    Cough    Sore Throat     103.3    Fatigue        Vitals:    23 1312   BP: (!) 141/85   Pulse: (!) 132   Resp: 18   Temp: (!) 101.8 °F (38.8 °C)   TempSrc: Oral   SpO2: 95%   Weight: 153 lb 12.8 oz (69.8 kg)   Height: 5' 4\" (1.626 m)   PainSc:   4       Health Maintenance Due   Topic    Depression Screen     COVID-19 Vaccine (1)    Varicella Vaccine (1 of 2 - 2-dose childhood series)    DTaP/Tdap/Td series (1 - Tdap)    Pap Smear        Coordination of Care Questionnaire:  :   1) Have you been to an emergency room, urgent care, or hospitalized since your last visit? If yes, where when, and reason for visit? no       2. Have seen or consulted any other health care provider since your last visit? If yes, where when, and reason for visit? NO      Patient is accompanied by self I have received verbal consent from [address] [place] [address] to discuss any/all medical information while they are present in the room. An electronic signature was used to authenticate this note.   -- Devante Osorio LPN

## 2023-04-19 NOTE — PROGRESS NOTES
Chief Complaint   Patient presents with    Fever    Cough    Sore Throat     103.3    Fatigue     HISTORY OF PRESENT ILLNESS  Katya Lopes is a 32 y.o. female. Onset Monday three days ago with sore throat; describes as \"tickle\". Associated mild cough. Fever started today. Last took Tylenol at 1100. Tolerating PO, loss of appetite. Breastfeeding 8 months old. Child has Double ear infection and conjunctivitis. Hx of allergies, no OTC     Review of Systems   Constitutional:  Positive for fever and malaise/fatigue. HENT:  Positive for congestion and sore throat. Respiratory:  Positive for cough. Negative for shortness of breath and wheezing. Cardiovascular: Negative. Gastrointestinal: Negative. Musculoskeletal: Negative. Neurological:  Positive for dizziness. Physical Exam  Vitals and nursing note reviewed. HENT:      Right Ear: Tympanic membrane normal.      Left Ear: Tympanic membrane normal.      Mouth/Throat:      Pharynx: Oropharynx is clear. Posterior oropharyngeal erythema present. No oropharyngeal exudate. Tonsils: Tonsillar exudate present. 2+ on the right. 2+ on the left. Comments: No localized LAD  Cardiovascular:      Rate and Rhythm: Tachycardia present. Pulmonary:      Effort: Pulmonary effort is normal. No respiratory distress. Breath sounds: Normal breath sounds. Musculoskeletal:      Cervical back: Neck supple. Neurological:      Mental Status: She is alert. History reviewed. No pertinent past medical history.   Past Surgical History:   Procedure Laterality Date    HX DILATION AND CURETTAGE  2011    SAB    HX ORTHOPAEDIC Right     foot surgery x3 - concrete block fell on foot    HX WISDOM TEETH EXTRACTION       BP (!) 141/85 (BP 1 Location: Left arm, BP Patient Position: Sitting, BP Cuff Size: Adult)   Pulse (!) 132   Temp (!) 101.8 °F (38.8 °C) (Oral)   Resp 18   Ht 5' 4\" (1.626 m)   Wt 153 lb 12.8 oz (69.8 kg)   SpO2 95%   BMI 26.40 kg/m² Results for orders placed or performed in visit on 04/19/23   AMB POC RAPID STREP A   Result Value Ref Range    VALID INTERNAL CONTROL POC Yes     Group A Strep Ag Negative Negative   POCT COVID-19, SARS-COV-2, PCR   Result Value Ref Range    SARS-COV-2 PCR, POC Negative Negative    VALID INTERNAL CONTROL POC Yes     LOT NUMBER     POC HETEROPHILE ANTIBODY SCREEN   Result Value Ref Range    VALID INTERNAL CONTROL POC Yes     Mononucleosis screen (POC) Negative Negative   ASSESSMENT and PLAN  1. Exudative tonsillitis  -     STREP GP A AG, IA W/REFLEX  -     cefdinir (OMNICEF) 300 mg capsule; Take 1 Capsule by mouth two (2) times a day for 10 days. , Normal, Disp-20 Capsule, R-0  -     dexamethasone (DECADRON) 10 mg/mL injection 10 mg; 10 mg, IntraVENous, ONCE, 1 dose, On Wed 4/19/23 at 1400  2. Viral illness  -     AMB POC RAPID STREP A  -     POCT COVID-19, SARS-COV-2, PCR  -     POC HETEROPHILE ANTIBODY SCREEN  Will treat for presumptive strep, culture pending. To treat a sore throat you should take mild pain medicine like acetaminophen or ibuprofen. Increase your fluids, eat a soft diet and do not smoke. Gargling with warm water or salt water (1 tsp. Salt in 8 oz. Water) can be helpful. Throat sprays and lozenges or sucking on hard candy will also ease the symptoms. She is tolerating PO in office. Discussed red flags for dehydration or worsening symptoms and seek Emergency care.    Follow up prn  LILLIANA Baker

## 2023-04-21 ENCOUNTER — PATIENT MESSAGE (OUTPATIENT)
Dept: PRIMARY CARE CLINIC | Age: 28
End: 2023-04-21

## 2023-04-21 NOTE — TELEPHONE ENCOUNTER
Message reviewed. Throat culture still pending.        Signed By: LILLIANA Bhardwaj     April 21, 2023

## 2023-04-22 LAB
S PYO THROAT QL CULT: NEGATIVE
SPECIMEN STATUS REPORT, ROLRST: NORMAL

## 2023-04-24 ENCOUNTER — TELEPHONE (OUTPATIENT)
Dept: PRIMARY CARE CLINIC | Age: 28
End: 2023-04-24

## 2023-04-24 NOTE — TELEPHONE ENCOUNTER
Called patient and discussed culture result. She is starting to feel better, no fever.    Signed By: LILLIANA Hinson     April 24, 2023

## 2024-07-12 ENCOUNTER — OFFICE VISIT (OUTPATIENT)
Age: 29
End: 2024-07-12
Payer: COMMERCIAL

## 2024-07-12 VITALS — BODY MASS INDEX: 28.32 KG/M2 | WEIGHT: 165 LBS | SYSTOLIC BLOOD PRESSURE: 140 MMHG | DIASTOLIC BLOOD PRESSURE: 96 MMHG

## 2024-07-12 DIAGNOSIS — Z01.419 WELL WOMAN EXAM: Primary | ICD-10-CM

## 2024-07-12 DIAGNOSIS — N63.0 BREAST MASS IN FEMALE: ICD-10-CM

## 2024-07-12 DIAGNOSIS — Z01.419 WELL WOMAN EXAM: ICD-10-CM

## 2024-07-12 PROCEDURE — 99385 PREV VISIT NEW AGE 18-39: CPT | Performed by: OBSTETRICS & GYNECOLOGY

## 2024-07-17 ENCOUNTER — PATIENT MESSAGE (OUTPATIENT)
Age: 29
End: 2024-07-17

## 2024-07-17 DIAGNOSIS — N94.6 DYSMENORRHEA: Primary | ICD-10-CM

## 2024-07-17 RX ORDER — MEFENAMIC ACID 250 MG/1
1 CAPSULE ORAL AS NEEDED
Qty: 28 CAPSULE | Refills: 0 | Status: SHIPPED | OUTPATIENT
Start: 2024-07-17

## 2024-07-17 NOTE — TELEPHONE ENCOUNTER
From: Zakia Rocha  To: Dr. Estrella Lim  Sent: 7/17/2024 8:25 AM EDT  Subject: Medicine Discussed in Appointment     Good morning,     We discussed a medication for me at my appointment to reduce my cramping and flow during my cycles that I should only take at max 2-3 days a month. We decided my worst two cycle days would be best and to stay hydrated while taking it. I do not remember the name of the medication by you stated it has been around for years.     So far, the pharmacy doesn’t have a record of the prescription being prescribed. I use the [facility] on [address] and [facility]. Is there a way we can either send it again or look into why they don’t have it?    Thank you!

## 2024-08-30 ENCOUNTER — HOSPITAL ENCOUNTER (OUTPATIENT)
Facility: HOSPITAL | Age: 29
End: 2024-08-30
Attending: OBSTETRICS & GYNECOLOGY
Payer: COMMERCIAL

## 2024-08-30 ENCOUNTER — HOSPITAL ENCOUNTER (OUTPATIENT)
Facility: HOSPITAL | Age: 29
Discharge: HOME | End: 2024-08-30
Attending: OBSTETRICS & GYNECOLOGY
Payer: COMMERCIAL

## 2024-08-30 VITALS — BODY MASS INDEX: 27.47 KG/M2 | WEIGHT: 164.9 LBS | HEIGHT: 65 IN

## 2024-08-30 DIAGNOSIS — N63.0 BREAST MASS IN FEMALE: ICD-10-CM

## 2024-08-30 PROCEDURE — G0279 TOMOSYNTHESIS, MAMMO: HCPCS

## 2024-08-30 PROCEDURE — 76642 ULTRASOUND BREAST LIMITED: CPT

## 2024-11-04 DIAGNOSIS — N94.6 DYSMENORRHEA: ICD-10-CM

## 2024-11-04 RX ORDER — MEFENAMIC ACID 250 MG/1
1 CAPSULE ORAL AS NEEDED
Qty: 28 CAPSULE | Refills: 0 | Status: SHIPPED | OUTPATIENT
Start: 2024-11-04

## 2025-03-13 DIAGNOSIS — N94.6 DYSMENORRHEA: ICD-10-CM

## 2025-03-15 RX ORDER — MEFENAMIC ACID 250 MG/1
1 CAPSULE ORAL AS NEEDED
Qty: 28 CAPSULE | Refills: 0 | Status: SHIPPED | OUTPATIENT
Start: 2025-03-15

## 2025-07-17 DIAGNOSIS — N94.6 DYSMENORRHEA: ICD-10-CM

## 2025-07-18 RX ORDER — MEFENAMIC ACID 250 MG/1
1 CAPSULE ORAL AS NEEDED
Qty: 28 CAPSULE | Refills: 4 | Status: SHIPPED | OUTPATIENT
Start: 2025-07-18

## (undated) DEVICE — D&C/GYN-LF: Brand: MEDLINE INDUSTRIES, INC.

## (undated) DEVICE — CONTINU-FLO SOLUTION SET, 2 INJECTION SITES, MALE LUER LOCK ADAPTER WITH RETRACTABLE COLLAR, LARGE BORE STOPCOCK WITH ROTATING MALE LUER LOCK EXTENSION SET, 2 INJECTION SITES, MALE LUER LOCK ADAPTER WITH RETRACTABLE COLLAR: Brand: INTERLINK/CONTINU-FLO

## (undated) DEVICE — NEEDLE SPNL 22GA L3.5IN BLK HUB S STL REG WALL FIT STYL W/

## (undated) DEVICE — 4-PORT MANIFOLD: Brand: NEPTUNE 2

## (undated) DEVICE — GOWN,SIRUS,FABRNF,XL,20/CS: Brand: MEDLINE

## (undated) DEVICE — STERILE POLYISOPRENE POWDER-FREE SURGICAL GLOVES WITH EMOLLIENT COATING: Brand: PROTEXIS

## (undated) DEVICE — TOWEL SURG W17XL27IN STD BLU COT NONFENESTRATED PREWASHED

## (undated) DEVICE — INFECTION CONTROL KIT SYS

## (undated) DEVICE — (D)SYR 10ML 1/5ML GRAD NSAF -- PKGING CHANGE USE ITEM 338027

## (undated) DEVICE — 3M™ TEGADERM™ TRANSPARENT FILM DRESSING FRAME STYLE, 1624W, 2-3/8 IN X 2-3/4 IN (6 CM X 7 CM), 100/CT 4CT/CASE: Brand: 3M™ TEGADERM™

## (undated) DEVICE — PREP PAD BNS: Brand: CONVERTORS

## (undated) DEVICE — SUTURE VCRL SZ 3-0 L27IN ABSRB VLT L36MM CT-1 1/2 CIR J338H

## (undated) DEVICE — MEDI-VAC NON-CONDUCTIVE SUCTION TUBING: Brand: CARDINAL HEALTH

## (undated) DEVICE — KENDALL DL ECG CABLE AND LEAD WIRE SYSTEM, 3-LEAD, SINGLE PATIENT USE: Brand: KENDALL

## (undated) DEVICE — LIGHT HANDLE: Brand: DEVON

## (undated) DEVICE — SOLUTION LACTATED RINGERS INJECTION USP

## (undated) DEVICE — CYSTO/BLADDER IRRIGATION SET, REGULATING CLAMP

## (undated) DEVICE — STERILE POLYISOPRENE POWDER-FREE SURGICAL GLOVES: Brand: PROTEXIS

## (undated) DEVICE — SOLUTION IRRIG 3000ML 0.9% SOD CHL FLX CONT 0797208] ICU MEDICAL INC]